# Patient Record
Sex: FEMALE | Race: BLACK OR AFRICAN AMERICAN | Employment: UNEMPLOYED | URBAN - METROPOLITAN AREA
[De-identification: names, ages, dates, MRNs, and addresses within clinical notes are randomized per-mention and may not be internally consistent; named-entity substitution may affect disease eponyms.]

---

## 2017-01-23 RX ORDER — TOLTERODINE 4 MG/1
CAPSULE, EXTENDED RELEASE ORAL
Qty: 90 CAP | Refills: 0 | Status: SHIPPED | OUTPATIENT
Start: 2017-01-23 | End: 2017-04-24 | Stop reason: SDUPTHER

## 2017-04-24 RX ORDER — TOLTERODINE 4 MG/1
CAPSULE, EXTENDED RELEASE ORAL
Qty: 90 CAP | Refills: 0 | Status: SHIPPED | OUTPATIENT
Start: 2017-04-24 | End: 2017-07-11

## 2017-07-11 ENCOUNTER — OFFICE VISIT (OUTPATIENT)
Dept: UROLOGY | Age: 27
End: 2017-07-11

## 2017-07-11 VITALS
OXYGEN SATURATION: 97 % | HEIGHT: 68 IN | WEIGHT: 205 LBS | DIASTOLIC BLOOD PRESSURE: 74 MMHG | BODY MASS INDEX: 31.07 KG/M2 | HEART RATE: 81 BPM | SYSTOLIC BLOOD PRESSURE: 100 MMHG

## 2017-07-11 DIAGNOSIS — N39.41 URGE INCONTINENCE: Primary | ICD-10-CM

## 2017-07-11 NOTE — PROGRESS NOTES
The patient is a pleasant 24-year-old -American female who is known to this office with a last visit here about 1 year ago. The patient has a history of urgency with urgency incontinence. Detrol has been of benefit to her and she continues to take it on a regular basis. She states that she has no nocturia and no nocturnal enuresis. During the day, there are times when she will go all day without voiding and other times when she will void very frequently in small volumes with urge. She does occasionally consume significant amounts of coffee but does not seem to make an association with the episodes of urinary frequency and urgency. The patient has had a mood disorder diagnosed and currently is on Abilify and also on Depakote she states that she is doing very well with that and does not have any significant change or depressive problems. The patient is unable to void today. I have advised her that we will continue her on the Detrol but she does need to be very cautious about her consumption of caffeine if she develops problems that override the benefit of the Detrol    This visit exceeded *10 minutes and greater than 50% was counseling. The patient expresses understanding of the treatment plan and wishes to proceed    This dictation used voice recognition software and there may be mistakes.     Lucero Mark MD

## 2017-07-11 NOTE — PATIENT INSTRUCTIONS
Urge Incontinence in Women: Care Instructions  Your Care Instructions  Urge incontinence occurs when the need to urinate is so strong that you cannot reach the toilet in time, even when your bladder contains only a small amount of urine. This is also called overactive bladder or unstable bladder. Some women may have no warning before they leak urine. This condition does not cause major health problems, but it can be embarrassing and can affect a woman's self-esteem and confidence. Treatment can cure or improve your symptoms. Follow-up care is a key part of your treatment and safety. Be sure to make and go to all appointments, and call your doctor if you are having problems. It's also a good idea to know your test results and keep a list of the medicines you take. How can you care for yourself at home? · Take your medicines exactly as prescribed. Call your doctor if you think you are having a problem with your medicine. You will get more details on the specific medicines your doctor prescribes. · Limit caffeine and alcohol--they stimulate urine production. · Urinate every 2 to 4 hours during waking hours, even if you feel that you do not have to go. · Do pelvic floor (Kegel) exercises, which tighten and strengthen pelvic muscles. To do Kegel exercises:  ¨ Squeeze the same muscles you would use to stop your urine. Your belly and thighs should not move. ¨ Hold the squeeze for 3 seconds, then relax for 3 seconds. ¨ Start with 3 seconds. Then add 1 second each week until you are able to squeeze for 10 seconds. ¨ Repeat the exercise 10 to 15 times for each session. Do three or more sessions each day. · Try wearing pads that absorb the leaks. · Keep skin in the genital area dry. When should you call for help? Call your doctor now or seek immediate medical care if:  · You have symptoms of a urinary infection. For example:  ¨ You have blood or pus in your urine.   ¨ You have pain in your back just below your rib cage. This is called flank pain. ¨ You have a fever, chills, or body aches. ¨ It hurts to urinate. ¨ You have groin or belly pain. Watch closely for changes in your health, and be sure to contact your doctor if:  · You have a hard time urinating when your bladder feels full. · You feel like you need to urinate often. · Your bladder feels full even after you urinate. · Your symptoms are getting worse. Where can you learn more? Go to http://devang-karin.info/. Enter Q593 in the search box to learn more about \"Urge Incontinence in Women: Care Instructions. \"  Current as of: October 13, 2016  Content Version: 11.3  © 5451-8687 [place], [employer]. Care instructions adapted under license by [employer] (which disclaims liability or warranty for this information). If you have questions about a medical condition or this instruction, always ask your healthcare professional. [place] [address] any warranty or liability for your use of this information.

## 2017-07-11 NOTE — PROGRESS NOTES
Ms. Roly Villanueva has a reminder for a \"due or due soon\" health maintenance. I have asked that she contact her primary care provider for follow-up on this health maintenance.

## 2017-07-11 NOTE — MR AVS SNAPSHOT
Visit Information Date & Time Provider Department Dept. Phone Encounter #  
 7/11/2017 10:45 AM Hung Israel Mingo [phone] [id number] Upcoming Health Maintenance Date Due  
 HPV AGE 9Y-34Y (1 of 3 - Female 3 Dose Series) 8/13/2001 Pneumococcal 19-64 Medium Risk (1 of 1 - PPSV23) 8/13/2009 DTaP/Tdap/Td series (1 - Tdap) 8/13/2011 PAP AKA CERVICAL CYTOLOGY 1/26/2015 INFLUENZA AGE 9 TO ADULT 8/1/2017 Allergies as of 7/11/2017  Review Complete On: 7/11/2017 By: Lizeth De Jesus LPN Severity Noted Reaction Type Reactions Bactrim [Sulfamethoprim Ds]  01/20/2014   Not Verified Unknown (comments) Onion  01/29/2013    Rash Pt states allergy to oinions Current Immunizations  Never Reviewed No immunizations on file. Not reviewed this visit You Were Diagnosed With   
  
 Codes Comments Urge incontinence    -  Primary ICD-10-CM: N39.41 
ICD-9-CM: 788.31 Vitals BP Pulse Height(growth percentile) Weight(growth percentile) SpO2 BMI  
 100/74 (BP 1 Location: Left arm, BP Patient Position: Sitting) 81 5' 8\" (1.727 m) 205 lb (93 kg) 97% 31.17 kg/m2 OB Status Smoking Status Having regular periods Current Every Day Smoker Vitals History BMI and BSA Data Body Mass Index Body Surface Area  
 31.17 kg/m 2 2.11 m 2 [facility] [address] [phone] Your Updated Medication List  
  
   
This list is accurate as of: 7/11/17 11:59 AM.  Always use your most recent med list.  
  
  
  
  
 ABILIFY 5 mg tablet Generic drug:  ARIPiprazole Take 5 mg by mouth daily. DEPAKOTE PO Take  by mouth. HYDROcodone-acetaminophen 5-325 mg per tablet Commonly known as:  Janie Plum Take  by mouth. ibuprofen 600 mg tablet Commonly known as:  MOTRIN Take  by mouth every six (6) hours as needed for Pain.  
  
 tolterodine ER 4 mg ER capsule Commonly known as:  Richmond Byers Take 1 Cap by mouth daily. We Performed the Following AMB POC URINALYSIS DIP STICK AUTO W/O MICRO [92732 CPT(R)] Patient Instructions Urge Incontinence in Women: Care Instructions Your Care Instructions Urge incontinence occurs when the need to urinate is so strong that you cannot reach the toilet in time, even when your bladder contains only a small amount of urine. This is also called overactive bladder or unstable bladder. Some women may have no warning before they leak urine. This condition does not cause major health problems, but it can be embarrassing and can affect a woman's self-esteem and confidence. Treatment can cure or improve your symptoms. Follow-up care is a key part of your treatment and safety. Be sure to make and go to all appointments, and call your doctor if you are having problems. It's also a good idea to know your test results and keep a list of the medicines you take. How can you care for yourself at home? · Take your medicines exactly as prescribed. Call your doctor if you think you are having a problem with your medicine. You will get more details on the specific medicines your doctor prescribes. · Limit caffeine and alcoholthey stimulate urine production. · Urinate every 2 to 4 hours during waking hours, even if you feel that you do not have to go. · Do pelvic floor (Kegel) exercises, which tighten and strengthen pelvic muscles. To do Kegel exercises: 
¨ Squeeze the same muscles you would use to stop your urine. Your belly and thighs should not move. ¨ Hold the squeeze for 3 seconds, then relax for 3 seconds. ¨ Start with 3 seconds. Then add 1 second each week until you are able to squeeze for 10 seconds. ¨ Repeat the exercise 10 to 15 times for each session.  Do three or more sessions each day. · Try wearing pads that absorb the leaks. · Keep skin in the genital area dry. When should you call for help? Call your doctor now or seek immediate medical care if: 
· You have symptoms of a urinary infection. For example: ¨ You have blood or pus in your urine. ¨ You have pain in your back just below your rib cage. This is called flank pain. ¨ You have a fever, chills, or body aches. ¨ It hurts to urinate. ¨ You have groin or belly pain. Watch closely for changes in your health, and be sure to contact your doctor if: 
· You have a hard time urinating when your bladder feels full. · You feel like you need to urinate often. · Your bladder feels full even after you urinate. · Your symptoms are getting worse. Where can you learn more? Go to http://devang-karin.info/. Enter X106 in the search box to learn more about \"Urge Incontinence in Women: Care Instructions. \" Current as of: October 13, 2016 Content Version: 11.3 © 6067-3931 [employer]. Care instructions adapted under license by [employer] (which disclaims liability or warranty for this information). If you have questions about a medical condition or this instruction, always ask your healthcare professional. [address] any warranty or liability for your use of this information. Introducing [facility]! Sheri Lyons introduces [employer] patient portal. Now you can access parts of your medical record, email your doctor's office, and request medication refills online. 1. In your internet browser, go to [email or web address] 2. Click on the First Time User? Click Here link in the Sign In box. You will see the New Member Sign Up page. 3. Enter your [employer] Access Code exactly as it appears below. You will not need to use this code after youve completed the sign-up process.  If you do not sign up before the expiration date, you must request a new code. · [employer] Access Code: [email or web address] Expires: 10/9/2017 11:14 AM 
 
4. Enter the last four digits of your Social Security Number (xxxx) and Date of Birth (mm/dd/yyyy) as indicated and click Submit. You will be taken to the next sign-up page. 5. Create a [employer] ID. This will be your [employer] login ID and cannot be changed, so think of one that is secure and easy to remember. 6. Create a [employer] password. You can change your password at any time. 7. Enter your Password Reset Question and Answer. This can be used at a later time if you forget your password. 8. Enter your e-mail address. You will receive e-mail notification when new information is available in [address] 9. Click Sign Up. You can now view and download portions of your medical record. 10. Click the Download Summary menu link to download a portable copy of your medical information. If you have questions, please visit the Frequently Asked Questions section of the [employer] website. Remember, [employer] is NOT to be used for urgent needs. For medical emergencies, dial 911. Now available from your iPhone and Android! Please provide this summary of care documentation to your next provider. Your primary care clinician is listed as NONE. If you have any questions after today's visit, please call [phone].

## 2017-07-20 RX ORDER — TOLTERODINE 4 MG/1
CAPSULE, EXTENDED RELEASE ORAL
Qty: 90 CAP | Refills: 0 | Status: SHIPPED | OUTPATIENT
Start: 2017-07-20 | End: 2017-10-15 | Stop reason: SDUPTHER

## 2017-10-17 RX ORDER — TOLTERODINE 4 MG/1
CAPSULE, EXTENDED RELEASE ORAL
Qty: 90 CAP | Refills: 0 | Status: SHIPPED | OUTPATIENT
Start: 2017-10-17 | End: 2018-01-08 | Stop reason: SDUPTHER

## 2018-01-09 RX ORDER — TOLTERODINE 4 MG/1
CAPSULE, EXTENDED RELEASE ORAL
Qty: 90 CAP | Refills: 0 | Status: SHIPPED | OUTPATIENT
Start: 2018-01-09 | End: 2018-04-08 | Stop reason: SDUPTHER

## 2018-01-23 ENCOUNTER — DOCUMENTATION ONLY (OUTPATIENT)
Dept: UROLOGY | Age: 28
End: 2018-01-23

## 2018-01-23 NOTE — PROGRESS NOTES
Sent in prescription for adult diapers xlg per .  # to call is [phone] fax # is [phone]

## 2018-04-09 RX ORDER — TOLTERODINE 4 MG/1
CAPSULE, EXTENDED RELEASE ORAL
Qty: 90 CAP | Refills: 0 | Status: SHIPPED | OUTPATIENT
Start: 2018-04-09 | End: 2018-07-17 | Stop reason: SDUPTHER

## 2018-07-10 ENCOUNTER — OFFICE VISIT (OUTPATIENT)
Dept: UROLOGY | Age: 28
End: 2018-07-10

## 2018-07-10 VITALS
SYSTOLIC BLOOD PRESSURE: 104 MMHG | TEMPERATURE: 97.7 F | WEIGHT: 205 LBS | DIASTOLIC BLOOD PRESSURE: 68 MMHG | HEIGHT: 68 IN | HEART RATE: 88 BPM | BODY MASS INDEX: 31.07 KG/M2 | OXYGEN SATURATION: 97 %

## 2018-07-10 DIAGNOSIS — R39.15 URGENCY OF URINATION: Primary | ICD-10-CM

## 2018-07-10 LAB
BILIRUB UR QL STRIP: NEGATIVE
GLUCOSE UR-MCNC: NEGATIVE MG/DL
KETONES P FAST UR STRIP-MCNC: NORMAL MG/DL
PH UR STRIP: 7 [PH] (ref 4.6–8)
PROT UR QL STRIP: NEGATIVE
SP GR UR STRIP: 1.02 (ref 1–1.03)
UA UROBILINOGEN AMB POC: NORMAL (ref 0.2–1)
URINALYSIS CLARITY POC: CLEAR
URINALYSIS COLOR POC: NORMAL
URINE BLOOD POC: NEGATIVE
URINE LEUKOCYTES POC: NEGATIVE
URINE NITRITES POC: NEGATIVE

## 2018-07-10 NOTE — PROGRESS NOTES
Ms. Terence Nobles has a reminder for a \"due or due soon\" health maintenance. I have asked that she contact her primary care provider for follow-up on this health maintenance.

## 2018-07-10 NOTE — MR AVS SNAPSHOT
[facility] [address] [address] 
[phone] Patient: Brannon Martinez MRN: [id number] EQK:[id number] Visit Information Date & Time Provider Department Dept. Phone Encounter #  
 7/10/2018 10:45 AM [address] [facility] [id number] [phone] Your Appointments 7/9/2019 11:00 AM  
Office Visit with Anjelica Wilkins MD  
[facility] Appt Note: checkup [address] [facility] [address] [address]  
[phone] [address] [facility] [address] Upcoming Health Maintenance Date Due Pneumococcal 19-64 Medium Risk (1 of 1 - PPSV23) 8/13/2009 DTaP/Tdap/Td series (1 - Tdap) 8/13/2011 PAP AKA CERVICAL CYTOLOGY 1/26/2015 Influenza Age 5 to Adult 8/1/2018 Allergies as of 7/10/2018  Review Complete On: 7/10/2018 By: Anjelica Wilkins MD  
  
 Severity Noted Reaction Type Reactions Bactrim [Sulfamethoprim Ds]  01/20/2014   Not Verified Unknown (comments) Onion  01/29/2013    Rash Pt states allergy to oinions Current Immunizations  Never Reviewed No immunizations on file. Not reviewed this visit You Were Diagnosed With   
  
 Codes Comments Urgency of urination    -  Primary ICD-10-CM: R39.15 ICD-9-CM: 770.21 Vitals BP Pulse Temp Height(growth percentile) Weight(growth percentile) SpO2  
 104/68 (BP 1 Location: Left arm, BP Patient Position: Sitting) 88 97.7 °F (36.5 °C) 5' 8\" (1.727 m) 205 lb (93 kg) 97% BMI OB Status Smoking Status 31.17 kg/m2 Having regular periods Current Every Day Smoker Vitals History BMI and BSA Data Body Mass Index Body Surface Area  
 31.17 kg/m 2 2.11 m 2 Preferred Pharmacy Pharmacy Name Phone Jr [address], [address] [place] [address] [phone] Your Updated Medication List  
  
   
This list is accurate as of 7/10/18 11:39 AM.  Always use your most recent med list.  
  
  
  
  
 ABILIFY 5 mg tablet Generic drug:  ARIPiprazole Take 5 mg by mouth daily. DEPAKOTE PO Take  by mouth. HYDROcodone-acetaminophen 5-325 mg per tablet Commonly known as:  Theodore Punt Take  by mouth. ibuprofen 600 mg tablet Commonly known as:  MOTRIN Take  by mouth every six (6) hours as needed for Pain. * tolterodine ER 4 mg ER capsule Commonly known as:  Osman Jaswinder Take 1 Cap by mouth daily. * tolterodine ER 4 mg ER capsule Commonly known as:  DETROL LA  
TAKE 1 CAPSULE BY MOUTH EVERY DAY  
  
 * Notice: This list has 2 medication(s) that are the same as other medications prescribed for you. Read the directions carefully, and ask your doctor or other care provider to review them with you. We Performed the Following AMB POC URINALYSIS DIP STICK AUTO W/O MICRO [59635 CPT(R)] Patient Instructions Urine Test: About This Test 
What is it? A urine test checks the color, clarity (clear or cloudy), odor, concentration, and acidity (pH) of your urine. It also checks your levels of protein, sugar, blood cells, or other substances in your urine. This test is sometimes called a urinalysis. Why is this test done? A urine test may be done: · To check for a disease or infection of the urinary tract. The urinary tract includes the kidneys, the tubes that carry urine from the kidneys to the bladder (ureters), and the bladder. It also includes the tube that carries urine from the bladder to outside the body (urethra). · To check the treatment of conditions such as diabetes, kidney stones, a urinary tract infection (UTI), high blood pressure, or some kidney or liver diseases.  
How can you prepare for the test? 
· Before the test, don't eat foods that can change the color of your urine. Examples of these include blackberries, beets, and rhubarb. · Don't do heavy exercise before the test. 
· Tell your doctor if you are menstruating or close to starting your period. Your doctor may want to wait to do the test. 
· Tell your doctor about all the nonprescription and prescription medicines and herbs or other supplements you take. Some of these can affect the results of this test. 
What happens during the test? 
A urine test can be done in your doctor's office, clinic, or lab. Or you may be asked to collect a urine sample at home. Then you can take it to the office or lab for testing. Clean-catch midstream urine collection · Wash your hands before you start. · If the cup you are given has a lid, remove it carefully. Set it down with the inner surface up. Don't touch the inside of the cup with your fingers. · Clean the area around your genitals. ¨ For men: Pull back the foreskin, if present. Clean the head of your penis with medicated towelettes or swabs. ¨ For women: Spread open the genital folds of skin with one hand. Then use medicated towelettes or swabs in your other hand to clean the area where urine comes out (the urethra). Wipe the area from front to back. · Start urinating into the toilet or urinal. A woman should hold apart the genital folds of skin while she urinates. · After the urine has flowed for several seconds, place the cup into the urine stream. Collect about 2 ounces of urine without stopping your flow of urine. · Don't touch the rim of the cup to your genital area. Don't get toilet paper, pubic hair, stool (feces), menstrual blood, or anything else in the urine sample. · Finish urinating into the toilet or urinal. 
· Carefully replace and tighten the lid on the cup, and then return it to the lab. If you are collecting the urine at home and can't get it to the lab in an hour, refrigerate it. Double-voided urine sample collection This method collects the urine your body is making right now. · Urinate into the toilet or urinal. Don't collect any of this urine. · Drink a large glass of water, and wait about 30 to 40 minutes. · Then get a urine sample. Follow the instructions above for collecting a clean-catch urine sample. · Take the urine sample to the lab. If you are collecting the urine at home and can't get it to the lab in an hour, refrigerate it. Follow-up care is a key part of your treatment and safety. Be sure to make and go to all appointments, and call your doctor if you are having problems. It's also a good idea to keep a list of the medicines you take. Ask your doctor when you can expect to have your test results. Where can you learn more? Go to http://devang-karin.info/. Enter R266 in the search box to learn more about \"Urine Test: About This Test.\" Current as of: October 14, 2016 Content Version: 11.4 © 7948-4058 [employer]. Care instructions adapted under license by [employer] (which disclaims liability or warranty for this information). If you have questions about a medical condition or this instruction, always ask your healthcare professional. [address] any warranty or liability for your use of this information. Introducing [facility]! [facility] introduces [employer] patient portal. Now you can access parts of your medical record, email your doctor's office, and request medication refills online. 1. In your internet browser, go to [email or web address] 2. Click on the First Time User? Click Here link in the Sign In box. You will see the New Member Sign Up page. 3. Enter your [employer] Access Code exactly as it appears below. You will not need to use this code after youve completed the sign-up process. If you do not sign up before the expiration date, you must request a new code. · [employer] Access Code: [email or web address] Expires: 10/8/2018 11:39 AM 
 
4. Enter the last four digits of your Social Security Number (xxxx) and Date of Birth (mm/dd/yyyy) as indicated and click Submit. You will be taken to the next sign-up page. 5. Create a [employer] ID. This will be your [employer] login ID and cannot be changed, so think of one that is secure and easy to remember. 6. Create a [employer] password. You can change your password at any time. 7. Enter your Password Reset Question and Answer. This can be used at a later time if you forget your password. 8. Enter your e-mail address. You will receive e-mail notification when new information is available in [address] 9. Click Sign Up. You can now view and download portions of your medical record. 10. Click the Download Summary menu link to download a portable copy of your medical information. If you have questions, please visit the Frequently Asked Questions section of the [employer] website. Remember, [employer] is NOT to be used for urgent needs. For medical emergencies, dial 911. Now available from your iPhone and Android! Please provide this summary of care documentation to your next provider. Your primary care clinician is listed as NONE. If you have any questions after today's visit, please call [phone].

## 2018-07-10 NOTE — PATIENT INSTRUCTIONS
Urine Test: About This Test  What is it? A urine test checks the color, clarity (clear or cloudy), odor, concentration, and acidity (pH) of your urine. It also checks your levels of protein, sugar, blood cells, or other substances in your urine. This test is sometimes called a urinalysis. Why is this test done? A urine test may be done:  · To check for a disease or infection of the urinary tract. The urinary tract includes the kidneys, the tubes that carry urine from the kidneys to the bladder (ureters), and the bladder. It also includes the tube that carries urine from the bladder to outside the body (urethra). · To check the treatment of conditions such as diabetes, kidney stones, a urinary tract infection (UTI), high blood pressure, or some kidney or liver diseases. How can you prepare for the test?  · Before the test, don't eat foods that can change the color of your urine. Examples of these include blackberries, beets, and rhubarb. · Don't do heavy exercise before the test.  · Tell your doctor if you are menstruating or close to starting your period. Your doctor may want to wait to do the test.  · Tell your doctor about all the nonprescription and prescription medicines and herbs or other supplements you take. Some of these can affect the results of this test.  What happens during the test?  A urine test can be done in your doctor's office, clinic, or lab. Or you may be asked to collect a urine sample at home. Then you can take it to the office or lab for testing. Clean-catch midstream urine collection  · Wash your hands before you start. · If the cup you are given has a lid, remove it carefully. Set it down with the inner surface up. Don't touch the inside of the cup with your fingers. · Clean the area around your genitals. ¨ For men: Pull back the foreskin, if present. Clean the head of your penis with medicated towelettes or swabs.   ¨ For women: Spread open the genital folds of skin with one hand. Then use medicated towelettes or swabs in your other hand to clean the area where urine comes out (the urethra). Wipe the area from front to back. · Start urinating into the toilet or urinal. A woman should hold apart the genital folds of skin while she urinates. · After the urine has flowed for several seconds, place the cup into the urine stream. Collect about 2 ounces of urine without stopping your flow of urine. · Don't touch the rim of the cup to your genital area. Don't get toilet paper, pubic hair, stool (feces), menstrual blood, or anything else in the urine sample. · Finish urinating into the toilet or urinal.  · Carefully replace and tighten the lid on the cup, and then return it to the lab. If you are collecting the urine at home and can't get it to the lab in an hour, refrigerate it. Double-voided urine sample collection  This method collects the urine your body is making right now. · Urinate into the toilet or urinal. Don't collect any of this urine. · Drink a large glass of water, and wait about 30 to 40 minutes. · Then get a urine sample. Follow the instructions above for collecting a clean-catch urine sample. · Take the urine sample to the lab. If you are collecting the urine at home and can't get it to the lab in an hour, refrigerate it. Follow-up care is a key part of your treatment and safety. Be sure to make and go to all appointments, and call your doctor if you are having problems. It's also a good idea to keep a list of the medicines you take. Ask your doctor when you can expect to have your test results. Where can you learn more? Go to http://devang-karin.info/. Enter R266 in the search box to learn more about \"Urine Test: About This Test.\"  Current as of: October 14, 2016  Content Version: 11.4  © 3825-0730 [employer], [employer].  Care instructions adapted under license by [employer] (which disclaims liability or warranty for this information). If you have questions about a medical condition or this instruction, always ask your healthcare professional. [place] [address] any warranty or liability for your use of this information.

## 2018-07-10 NOTE — PROGRESS NOTES
[address]    Chief Complaint   Patient presents with    Urgency    Incontinence       History and Physical    Patient is a pleasant 44-year-old -American female who is known to this office with a last visit here 1 year ago. He has a long history of detrusor instability for which she has been taking tolterodine. She has been on this for several years. She takes it every day. If she misses a dose, she notices it virtually immediately and has a return of frequency and urgency. The patient does consume some caffeine in the form of morning coffee, daily caffeinated soft drinks. She will occasionally consume ice tea. She does not feel that there is a significant aggravation of symptoms as long as she is taking her Detrol while consuming these beverages. She is not suffering any of the side effects of the tolterodine    Past Medical History:   Diagnosis Date    Asthma     Mood disorder [facility])      Patient Active Problem List   Diagnosis Code    Unspecified episodic mood disorder F39    Other, mixed, or unspecified nondependent drug abuse, unspecified F19.10     Past Surgical History:   Procedure Laterality Date    HX OTHER SURGICAL  11/2012    Leg result of accident     Current Outpatient Prescriptions   Medication Sig Dispense Refill    tolterodine ER (DETROL LA) 4 mg ER capsule Take 1 Cap by mouth daily. 90 Cap 3    DIVALPROEX SODIUM (DEPAKOTE PO) Take  by mouth.  ARIPiprazole (ABILIFY) 5 mg tablet Take 5 mg by mouth daily.  tolterodine ER (DETROL LA) 4 mg ER capsule TAKE 1 CAPSULE BY MOUTH EVERY DAY 90 Cap 0    HYDROcodone-acetaminophen (NORCO) 5-325 mg per tablet Take  by mouth.  ibuprofen (MOTRIN) 600 mg tablet Take  by mouth every six (6) hours as needed for Pain.        Allergies   Allergen Reactions    Bactrim [Sulfamethoprim Ds] Unknown (comments)    Onion Rash     Pt states allergy to oinions     Social History     Social History    Marital status:  Spouse name: N/A    Number of children: N/A    Years of education: N/A     Occupational History    Not on file. Social History Main Topics    Smoking status: Current Every Day Smoker     Packs/day: 1.00    Smokeless tobacco: Never Used    Alcohol use No    Drug use: 3.00 per week     Special: Marijuana    Sexual activity: Yes     Partners: Female, Male     Birth control/ protection: Condom     Other Topics Concern    Not on file     Social History Narrative      Family History   Problem Relation Age of Onset    Depression Neg Hx     Suicide Neg Hx     Psychotic Disorder Neg Hx     Substance Abuse Neg Hx     Dementia Neg Hx              Visit Vitals    /68 (BP 1 Location: Left arm, BP Patient Position: Sitting)    Pulse 88    Temp 97.7 °F (36.5 °C)    Ht 5' 8\" (1.727 m)    Wt 205 lb (93 kg)    SpO2 97%    BMI 31.17 kg/m2     Physical        Gen: [facility] adult NAD  Head  : normocephalic,  Normal ROM; eyes without normal pupils, EOMs, no masses;  conjunctiva normal  Neck: normal movement,  no evident mass,  No evident adenopathy, trachea midline,    Flanks     -    Extremities- no edema, arthritis, deformity, swelling  Psych- oriented, no evident anxiety, no cognitive impairment evident      Urine is negative                  Impression/ PLAN  Detrusor instability well-controlled on Detrol while consuming caffeinated substances    Plan: We will maintain the tolterodine prescription and the patient will return in 1 year            This visit exceeded 10 minutes and >50% was counselling  The patient understands the discussion and plan    PLEASE NOTE:      This document has been produced using voice recognition software.   Unrecognized errors in transcription may be present    Isabella Collazo MD

## 2018-07-17 RX ORDER — TOLTERODINE 4 MG/1
CAPSULE, EXTENDED RELEASE ORAL
Qty: 90 CAP | Refills: 0 | Status: SHIPPED | OUTPATIENT
Start: 2018-07-17 | End: 2018-09-08 | Stop reason: SDUPTHER

## 2018-09-11 RX ORDER — TOLTERODINE 4 MG/1
CAPSULE, EXTENDED RELEASE ORAL
Qty: 90 CAP | Refills: 0 | Status: SHIPPED | OUTPATIENT
Start: 2018-09-11 | End: 2019-04-13 | Stop reason: SDUPTHER

## 2019-04-16 DIAGNOSIS — N39.41 URGE INCONTINENCE: ICD-10-CM

## 2019-04-16 RX ORDER — TOLTERODINE 4 MG/1
CAPSULE, EXTENDED RELEASE ORAL
Qty: 90 CAP | Refills: 0 | Status: SHIPPED | OUTPATIENT
Start: 2019-04-16 | End: 2019-10-01 | Stop reason: SDUPTHER

## 2019-04-16 RX ORDER — TOLTERODINE 4 MG/1
4 CAPSULE, EXTENDED RELEASE ORAL DAILY
Qty: 90 CAP | Refills: 3 | Status: SHIPPED | OUTPATIENT
Start: 2019-04-16 | End: 2019-07-09

## 2019-07-09 ENCOUNTER — OFFICE VISIT (OUTPATIENT)
Dept: UROLOGY | Age: 29
End: 2019-07-09

## 2019-07-09 VITALS
SYSTOLIC BLOOD PRESSURE: 100 MMHG | OXYGEN SATURATION: 97 % | HEIGHT: 68 IN | BODY MASS INDEX: 30.62 KG/M2 | TEMPERATURE: 97.9 F | WEIGHT: 202 LBS | HEART RATE: 95 BPM | DIASTOLIC BLOOD PRESSURE: 65 MMHG

## 2019-07-09 DIAGNOSIS — R32 URINARY INCONTINENCE, UNSPECIFIED TYPE: Primary | ICD-10-CM

## 2019-07-09 RX ORDER — TOLTERODINE 4 MG/1
4 CAPSULE, EXTENDED RELEASE ORAL DAILY
Qty: 90 CAP | Refills: 3 | Status: SHIPPED | OUTPATIENT
Start: 2019-07-09 | End: 2020-09-21 | Stop reason: ALTCHOICE

## 2019-07-09 NOTE — PATIENT INSTRUCTIONS
Bladder Training: Care Instructions Your Care Instructions Bladder training is used to treat urge incontinence and stress incontinence. Urge incontinence means that the need to urinate comes on so fast that you can't get to a toilet in time. Stress incontinence means that you leak urine because of pressure on your bladder. For example, it may happen when you laugh, cough, or lift something heavy. Bladder training can increase how long you can wait before you have to urinate. It can also help your bladder hold more urine. And it can give you better control over the urge to urinate. It is important to remember that bladder training takes a few weeks to a few months to make a difference. You may not see results right away, but don't give up. Follow-up care is a key part of your treatment and safety. Be sure to make and go to all appointments, and call your doctor if you are having problems. It's also a good idea to know your test results and keep a list of the medicines you take. How can you care for yourself at home? Work with your doctor to come up with a bladder training program that is right for you. You may use one or more of the following methods. Delayed urination · In the beginning, try to keep from urinating for 5 minutes after you first feel the need to go. · While you wait, take deep, slow breaths to relax. Kegel exercises can also help you delay the need to go to the bathroom. · After some practice, when you can easily wait 5 minutes to urinate, try to wait 10 minutes before you urinate. · Slowly increase the waiting period until you are able to control when you have to urinate. Scheduled urination · Empty your bladder when you first wake up in the morning. · Schedule times throughout the day when you will urinate. · Start by going to the bathroom every hour, even if you don't need to go. · Slowly increase the time between trips to the bathroom. · When you have found a schedule that works well for you, keep doing it. · If you wake up during the night and have to urinate, do it. Apply your schedule to waking hours only. Kegel exercises These tighten and strengthen pelvic muscles, which can help you control the flow of urine. To do Kegel exercises: 
· Squeeze the same muscles you would use to stop your urine. Your belly and thighs should not move. · Hold the squeeze for 3 seconds, and then relax for 3 seconds. · Start with 3 seconds. Then add 1 second each week until you are able to squeeze for 10 seconds. · Repeat the exercise 10 to 15 times a session. Do three or more sessions a day. When should you call for help? Watch closely for changes in your health, and be sure to contact your doctor if: 
  · Your incontinence is getting worse.  
  · You do not get better as expected. Where can you learn more? Go to http://devang-karin.info/. Enter J075 in the search box to learn more about \"Bladder Training: Care Instructions. \" Current as of: March 20, 2018 Content Version: 11.9 © 7715-8646 Healthwise, [employer]. Care instructions adapted under license by [employer] (which disclaims liability or warranty for this information). If you have questions about a medical condition or this instruction, always ask your healthcare professional. [address] any warranty or liability for your use of this information.

## 2019-07-09 NOTE — PROGRESS NOTES
Chief Complaint   Patient presents with    Urinary Incontinence       HISTORY OF PRESENT ILLNESS:  Lacie Mclean is a 29 y.o. female comes in today for refill of her Detrol. She has a long history of urinary urgency and urge urinary but the Detrol works very well and she is very happy on this regimen. She is not having any dysuria urgency nor has she had any intermittent hematuria. She remains perfectly dry with this drug. Past Medical History:   Diagnosis Date    Asthma     Mood disorder [facility])        Past Surgical History:   Procedure Laterality Date    HX OTHER SURGICAL  11/2012    Leg result of accident       Social History     Tobacco Use    Smoking status: Current Every Day Smoker     Packs/day: 1.00    Smokeless tobacco: Never Used   Substance Use Topics    Alcohol use: No    Drug use: Yes     Frequency: 3.0 times per week     Types: Marijuana       Allergies   Allergen Reactions    Bactrim [Sulfamethoprim Ds] Unknown (comments)    Onion Rash and Hives     Pt states allergy to oinions    Sulfa (Sulfonamide Antibiotics) Itching       Family History   Problem Relation Age of Onset    Depression Neg Hx     Suicide Neg Hx     Psychotic Disorder Neg Hx     Substance Abuse Neg Hx     Dementia Neg Hx        Current Outpatient Medications   Medication Sig Dispense Refill    tolterodine ER (DETROL LA) 4 mg ER capsule Take 1 Cap by mouth daily. 90 Cap 3    tolterodine ER (DETROL LA) 4 mg ER capsule TAKE 1 CAPSULE BY MOUTH EVERY DAY 90 Cap 0    DIVALPROEX SODIUM (DEPAKOTE PO) Take  by mouth.  ARIPiprazole (ABILIFY) 5 mg tablet Take 5 mg by mouth daily.  HYDROcodone-acetaminophen (NORCO) 5-325 mg per tablet Take  by mouth.  ibuprofen (MOTRIN) 600 mg tablet Take  by mouth every six (6) hours as needed for Pain.              REVIEW OF SYSTEMS:   Documented on the chart      PHYSICAL EXAMINATION:     Visit Vitals  /65 (BP 1 Location: Left arm, BP Patient Position: Sitting)   Pulse 95   Temp 97.9 °F (36.6 °C)   Ht 5' 8\" (1.727 m)   Wt 202 lb (91.6 kg)   SpO2 97%   BMI 30.71 kg/m²     Constitutional: Well developed, well-nourished female in no acute distress. CV:  No peripheral swelling noted  Respiratory: No respiratory distress or difficulties  Abdomen:  Soft and nontender. No masses. No hepatosplenomegaly.  Female:  No CVA tenderness. Skin:  Normal color. No evidence of jaundice. Neuro/Psych:  Patient with appropriate affect. Alert and oriented. Lymphatic:   No enlargement of supraclavicular lymph nodes. Results for orders placed or performed in visit on 07/10/18   AMB POC URINALYSIS DIP STICK AUTO W/O MICRO   Result Value Ref Range    Color (UA POC) Katie     Clarity (UA POC) Clear     Glucose (UA POC) Negative Negative    Bilirubin (UA POC) Negative Negative    Ketones (UA POC) Trace Negative    Specific gravity (UA POC) 1.020 1.001 - 1.035    Blood (UA POC) Negative Negative    pH (UA POC) 7.0 4.6 - 8.0    Protein (UA POC) Negative Negative    Urobilinogen (UA POC) 0.2 mg/dL 0.2 - 1    Nitrites (UA POC) Negative Negative    Leukocyte esterase (UA POC) Negative Negative         REVIEW OF LABS AND IMAGING:      Imaging Report Reviewed? NO      Images Reviewed? NO           Other Lab Data Reviewed? YES    ASSESSMENT:     ICD-10-CM ICD-9-CM    1. Urinary incontinence, unspecified type R32 788.30 AMB POC URINALYSIS DIP STICK AUTO W/O MICRO      tolterodine ER (DETROL LA) 4 mg ER capsule                PLAN/DISCUSSION: Refill her Detrol and follow-up in 1 year. Patient voices understanding and agreement to the plan. Jesús Gary MD on 7/9/2019           Please note:     Voice recognition was used to generate this report, which may have resulted in some phonetic based errors in grammar and contents.  Even though attempts were made to correct all the mistakes, some may have been missed, and remained in the body of the document.

## 2019-07-09 NOTE — PROGRESS NOTES
Ms. Damion Allen has a reminder for a \"due or due soon\" health maintenance. I have asked that she contact her primary care provider for follow-up on this health maintenance.

## 2019-10-01 RX ORDER — TOLTERODINE 4 MG/1
CAPSULE, EXTENDED RELEASE ORAL
Qty: 90 CAP | Refills: 0 | Status: SHIPPED | OUTPATIENT
Start: 2019-10-01 | End: 2020-09-21